# Patient Record
Sex: FEMALE | Race: WHITE | Employment: UNEMPLOYED | URBAN - METROPOLITAN AREA
[De-identification: names, ages, dates, MRNs, and addresses within clinical notes are randomized per-mention and may not be internally consistent; named-entity substitution may affect disease eponyms.]

---

## 2019-04-04 DIAGNOSIS — Z79.2 NEED FOR PROPHYLACTIC ANTIBIOTIC: ICD-10-CM

## 2019-04-05 RX ORDER — AMOXICILLIN 500 MG/1
CAPSULE ORAL
Qty: 6 CAPSULE | Refills: 0 | Status: SHIPPED | OUTPATIENT
Start: 2019-04-05 | End: 2020-01-07 | Stop reason: SDUPTHER

## 2020-01-07 ENCOUNTER — TELEPHONE (OUTPATIENT)
Dept: ORTHOPEDIC SURGERY | Age: 70
End: 2020-01-07

## 2020-01-07 RX ORDER — AMOXICILLIN 500 MG/1
CAPSULE ORAL
Qty: 6 CAPSULE | Refills: 0 | Status: SHIPPED | OUTPATIENT
Start: 2020-01-07 | End: 2020-06-25

## 2020-06-25 RX ORDER — AMOXICILLIN 500 MG/1
CAPSULE ORAL
Qty: 6 CAPSULE | Refills: 0 | Status: SHIPPED | OUTPATIENT
Start: 2020-06-25 | End: 2022-10-25 | Stop reason: SDUPTHER

## 2020-08-24 ENCOUNTER — OFFICE VISIT (OUTPATIENT)
Dept: ORTHOPEDIC SURGERY | Age: 70
End: 2020-08-24

## 2020-08-24 PROCEDURE — 99024 POSTOP FOLLOW-UP VISIT: CPT | Performed by: ORTHOPAEDIC SURGERY

## 2020-08-24 NOTE — PROGRESS NOTES
Andrew Feng M.D.            [address] [facility] [address] [facility], [address] [facility]           Dept Phone: [phone]           Dept Fax:  [phone]           [place]          Dept Phone: [phone]           Dept Fax:  [phone]      Chief Compliant:  Chief Complaint   Patient presents with    Pain     Lt TKA Injury        History of Present Illness: This is a 78 y.o. female who presents to the clinic today for evaluation / follow up of left knee pain. Patient states that she had a left total knee arthroplasty performed by me approximately 7 years ago she was doing well until she fell several days ago. She was seen at [facility] emergency room and she is here for follow-up. Patient presents today in a knee immobilizer and has complaints of only left knee pain. She states that prior to this her knee was unremarkable. Review of Systems   Constitutional: Negative for fever, chills, sweats. Eyes: Negative for changes in vision, or pain. HENT: Negative for ear ache, epistaxis, or sore throat. Respiratory/Cardio: Negative for Chest pain, palpitations, SOB, or cough. Gastrointestinal: Negative for abdominal pain, N/V/D. Genitourinary: Negative for dysuria, frequency, urgency, or hematuria. Neurological: Negative for headache, numbness, or weakness. Integumentary: Negative for rash, itching, laceration, or abrasion. Musculoskeletal: Positive for Pain (Lt TKA Injury)       Physical Exam:  Constitutional: Patient is oriented to person, place, and time. Patient appears well-developed and well nourished. HENT: Negative otherwise noted  Head: Normocephalic and Atraumatic  Nose: Normal  Eyes: Conjunctivae and EOM are normal  Neck: Normal range of motion Neck supple.     Respiratory/Cardio: Effort normal. No respiratory distress. Musculoskeletal: Examination notes of the patient's left knee out of her immobilizer that she has no tenderness laterally but she is specifically tender on the medial side over the medial patellar area which correlates with her x-ray findings she has no obvious effusion per se she has no calf tenderness negative Homans. I did not do any varus or valgus stressing  Neurological: Patient is alert and oriented to person, place, and time. Normal strenght. No sensory deficit. Skin: Skin is warm and dry  Psychiatric: Behavior is normal. Thought content normal.  Nursing note and vitals reviewed. Labs and Imaging:     XR taken today:  Xr Knee Left (1-2 Views)    Result Date: 8/24/2020  X-rays taken they reviewed by me shows supine x-rays AP and lateral of the left knee. Patient is status post left total knee arthroplasty. It appears that the patient has sustained a nondisplaced medial epicondylar fracture about the prosthesis. It is nondisplaced completely and the prosthesis is stable on both views          Orders Placed This Encounter   Procedures    XR KNEE LEFT (1-2 VIEWS)     Standing Status:   Future     Number of Occurrences:   1     Standing Expiration Date:   8/24/2021       Assessment and Plan:  1. History of total left knee replacement            This is a 78 y.o. female who presents to the clinic today for evaluation / follow up of left total knee with nondisplaced medial epicondylar fracture. Past History:    Current Outpatient Medications:     amoxicillin (AMOXIL) 500 MG capsule, TAKE 2 CAPSULES ONE HOUR PRIOR TO DENTAL PROCEDURE OR APPOINTMENT, THEN TAKE ONE CAPSULE TWICE DAILY UNTIL GONE, Disp: 6 capsule, Rfl: 0    losartan (COZAAR) 50 MG tablet, Take 50 mg by mouth daily. , Disp: , Rfl:     omeprazole (PRILOSEC) 20 MG capsule, Take 20 mg by mouth daily. , Disp: , Rfl:     calcium carbonate (OSCAL) 500 MG TABS tablet, Take 500 mg by mouth daily. , Disp: , Rfl:     aspirin 81 MG be treated conservatively with the immobilizer. She may be weight-bear as tolerated as long as she has immobilizer on. We will see her back here in 6 weeks for repeat x-rays  Provider Attestation:  Haily Guadarrama, personally performed the services described in this documentation. All medical record entries made by the scribe were at my direction and in my presence. I have reviewed the chart and discharge instructions and agree that the records reflect my personal performance and is accurate and complete. Weston Urias MD. 08/24/20      Please note that this chart was generated using voice recognition Dragon dictation software. Although every effort was made to ensure the accuracy of this automated transcription, some errors in transcription may have occurred.

## 2020-09-30 ENCOUNTER — OFFICE VISIT (OUTPATIENT)
Dept: ORTHOPEDIC SURGERY | Age: 70
End: 2020-09-30
Payer: MEDICARE

## 2020-09-30 PROCEDURE — 99213 OFFICE O/P EST LOW 20 MIN: CPT | Performed by: ORTHOPAEDIC SURGERY

## 2020-09-30 NOTE — PROGRESS NOTES
person, place, and time. Normal strenght. No sensory deficit. Skin: Skin is warm and dry  Psychiatric: Behavior is normal. Thought content normal.  Nursing note and vitals reviewed. Labs and Imaging:     XR taken today:  Xr Knee Bilateral Standing    Result Date: 9/30/2020  X-rays taken today reviewed by me show standing AP of both knees. Patient is status post right unicompartmental arthroplasty. Patient's left knee is a total knee arthroplasty. She had a previous minimally to nondisplaced medial epicondylar fracture. Fracture fragment appears to be in the same position as the x-rays taken in August 24. No evidence for change in the prosthesis          Orders Placed This Encounter   Procedures    XR KNEE BILATERAL STANDING     Standing Status:   Future     Number of Occurrences:   1     Standing Expiration Date:   9/30/2021       Assessment and Plan:  1. History of total left knee replacement            This is a 78 y.o. female who presents to the clinic today for evaluation / follow up of left total knee with recent nondisplaced medial epicondylar fracture secondary to fall. Past History:    Current Outpatient Medications:     amoxicillin (AMOXIL) 500 MG capsule, TAKE 2 CAPSULES ONE HOUR PRIOR TO DENTAL PROCEDURE OR APPOINTMENT, THEN TAKE ONE CAPSULE TWICE DAILY UNTIL GONE, Disp: 6 capsule, Rfl: 0    losartan (COZAAR) 50 MG tablet, Take 50 mg by mouth daily. , Disp: , Rfl:     omeprazole (PRILOSEC) 20 MG capsule, Take 20 mg by mouth daily. , Disp: , Rfl:     calcium carbonate (OSCAL) 500 MG TABS tablet, Take 500 mg by mouth daily. , Disp: , Rfl:     aspirin 81 MG tablet, Take 81 mg by mouth daily. , Disp: , Rfl:     Multiple Vitamins-Minerals (THERAPEUTIC MULTIVITAMIN-MINERALS) tablet, Take 1 tablet by mouth daily. , Disp: , Rfl:   Allergies   Allergen Reactions    Seasonal      Social History     Socioeconomic History    Marital status:      Spouse name: Not on file    Number of children: Not on file    Years of education: Not on file    Highest education level: Not on file   Occupational History    Not on file   Social Needs    Financial resource strain: Not on file    Food insecurity     Worry: Not on file     Inability: Not on file    Transportation needs     Medical: Not on file     Non-medical: Not on file   Tobacco Use    Smoking status: Never Smoker   Substance and Sexual Activity    Alcohol use: No    Drug use: No    Sexual activity: Not on file   Lifestyle    Physical activity     Days per week: Not on file     Minutes per session: Not on file    Stress: Not on file   Relationships    Social connections     Talks on phone: Not on file     Gets together: Not on file     Attends Anabaptism service: Not on file     Active member of club or organization: Not on file     Attends meetings of clubs or organizations: Not on file     Relationship status: Not on file    Intimate partner violence     Fear of current or ex partner: Not on file     Emotionally abused: Not on file     Physically abused: Not on file     Forced sexual activity: Not on file   Other Topics Concern    Not on file   Social History Narrative    Not on file     Past Medical History:   Diagnosis Date    Cholecystitis     Osteoarthritis     Ulcer ([facility] [address]      Past Surgical History:   Procedure Laterality Date    CHOLECYSTECTOMY      HYSTERECTOMY      JOINT REPLACEMENT      KNEE SURGERY      TONSILLECTOMY       Family History   Problem Relation Age of Onset    Diabetes Mother     Heart Disease Mother     Hypertension Mother     Hypertension Father    Plan  Patient was advised that the fracture appears to be well-healed and stable. She may ambulate as tolerated without the brace. She is needs to work on some exercises to get her strength back. Anticipate her doing well.   Back here in 2 years unless she has problems prior to that time      Provider Attestation:  Ginger Roberts, personally

## 2021-08-17 ENCOUNTER — TELEPHONE (OUTPATIENT)
Dept: ORTHOPEDIC SURGERY | Age: 71
End: 2021-08-17

## 2021-08-17 RX ORDER — AMOXICILLIN 500 MG/1
500 CAPSULE ORAL 2 TIMES DAILY
Qty: 6 CAPSULE | Refills: 0 | Status: SHIPPED | OUTPATIENT
Start: 2021-08-17 | End: 2021-08-20

## 2021-08-17 NOTE — TELEPHONE ENCOUNTER
Pt called in requesting medication for upcoming dental procedure. A script was put in for her pharmacy.

## 2022-03-10 ENCOUNTER — TELEPHONE (OUTPATIENT)
Dept: ORTHOPEDIC SURGERY | Age: 72
End: 2022-03-10

## 2022-03-10 DIAGNOSIS — Z79.2 NEED FOR PROPHYLACTIC ANTIBIOTIC: Primary | ICD-10-CM

## 2022-03-10 DIAGNOSIS — Z96.652 HISTORY OF TOTAL LEFT KNEE REPLACEMENT: ICD-10-CM

## 2022-03-10 RX ORDER — AMOXICILLIN 500 MG/1
500 CAPSULE ORAL 2 TIMES DAILY
Qty: 6 CAPSULE | Refills: 0 | Status: SHIPPED | OUTPATIENT
Start: 2022-03-10 | End: 2022-03-13

## 2022-03-10 NOTE — TELEPHONE ENCOUNTER
Patient called [phone] and needs antibiotic for a dentist appt 03- and needs something called in before her appt- she uses [facility] in [place], please advise and reach pt once completed, thank you

## 2022-10-25 DIAGNOSIS — Z79.2 NEED FOR PROPHYLACTIC ANTIBIOTIC: ICD-10-CM

## 2022-10-25 RX ORDER — AMOXICILLIN 500 MG/1
CAPSULE ORAL
Qty: 6 CAPSULE | Refills: 0 | Status: SHIPPED | OUTPATIENT
Start: 2022-10-25

## 2022-10-25 NOTE — TELEPHONE ENCOUNTER
Patient is requesting a Pre-Med prior to her dental appointment on 11/2. Pharmacy on file has been verified. Thank you.